# Patient Record
Sex: FEMALE | URBAN - METROPOLITAN AREA
[De-identification: names, ages, dates, MRNs, and addresses within clinical notes are randomized per-mention and may not be internally consistent; named-entity substitution may affect disease eponyms.]

---

## 2024-01-18 ENCOUNTER — APPOINTMENT (RX ONLY)
Dept: URBAN - METROPOLITAN AREA CLINIC 41 | Facility: CLINIC | Age: 24
Setting detail: DERMATOLOGY
End: 2024-01-18

## 2024-01-18 DIAGNOSIS — L90.8 OTHER ATROPHIC DISORDERS OF SKIN: ICD-10-CM

## 2024-01-18 PROCEDURE — ? OTHER (COSMETIC)

## 2024-01-18 PROCEDURE — ? COUNSELING

## 2024-01-18 PROCEDURE — ? COSMETIC CONSULTATION: FILLERS

## 2024-01-18 PROCEDURE — ? FILLERS

## 2024-01-18 ASSESSMENT — LOCATION DETAILED DESCRIPTION DERM
LOCATION DETAILED: LEFT INFERIOR VERMILION LIP
LOCATION DETAILED: RIGHT SUPERIOR VERMILION LIP

## 2024-01-18 ASSESSMENT — LOCATION SIMPLE DESCRIPTION DERM
LOCATION SIMPLE: RIGHT LIP
LOCATION SIMPLE: LEFT LIP

## 2024-01-18 ASSESSMENT — LOCATION ZONE DERM: LOCATION ZONE: LIP

## 2024-01-18 NOTE — PROCEDURE: OTHER (COSMETIC)
Other (Free Text): ***USED THE FENCING TECHNIQUE***\\nPatient won a syringe of Edilson Shrestha at the November 30th cosmetic event.
Detail Level: Zone

## 2024-01-18 NOTE — PROCEDURE: FILLERS
Versa Syringe Price (Per 1.0 Cc- Numbers Only No Special Characters Or $): 0.00
Additional Area 3 Volume In Cc: 0
Filler: Restylane Kysse
Detail Level: Zone
Expiration Date (Month Year): 04/30/2024
Use Map Statement For Sites (Optional): No
Restylane-L Syringe Price (Per 1.0 Cc- Numbers Only No Special Characters Or $): 550.00
Vermilion Lips Filler Volume In Cc: 1
Edilson Hamilton Syringe Price (Per 1.0 Cc- Numbers Only No Special Characters Or $): 850.00
Lot #: [id number]
Map Statment: See Attach Map for Complete Details
Edilson Hedrick Syringe Price (Per 1.0 Cc- Numbers Only No Special Characters Or $): 650.00
Anesthesia Volume In Cc: 0.5
Consent: Written consent obtained. Risks include but not limited to bruising, beading, irregular texture, ulceration, infection, allergic reaction, scar formation, incomplete augmentation, temporary nature, procedural pain.
Pre-Treatment: Skin was cleaned with alcohol prior to injections.
Additional Anesthesia Volume In Cc: 6
Additional Area 1 Location: Pyriform aperture
Post-Care Instructions: Patient instructed to apply ice to reduce swelling.
Topical Anesthesia?: BLT cream (benzocaine 20%, lidocaine 10%, tetracaine 4%)
Pricing Information: This plan will add up the cumulative amount of filler you document and will bill based on the unit price noted below. For example if you inject 0.9 ccs of Restylane it will bill for one unit. If you inject 1.3 ccs it will bill for two units.

## 2024-01-31 ENCOUNTER — APPOINTMENT (RX ONLY)
Dept: URBAN - METROPOLITAN AREA CLINIC 41 | Facility: CLINIC | Age: 24
Setting detail: DERMATOLOGY
End: 2024-01-31

## 2024-01-31 DIAGNOSIS — L90.8 OTHER ATROPHIC DISORDERS OF SKIN: ICD-10-CM

## 2024-01-31 PROCEDURE — ? COUNSELING

## 2024-01-31 PROCEDURE — ? COSMETIC CONSULTATION: FILLERS

## 2024-01-31 PROCEDURE — ? COSMETIC FOLLOW-UP

## 2024-01-31 NOTE — PROCEDURE: COSMETIC FOLLOW-UP
Price (Use Numbers Only, No Special Characters Or $): 0
Global Improvement: Very Good
Treatment (Optional): Filler Injection
Patient Satisfaction: Very pleased
Detail Level: Zone
Side Effects Or Complications: None